# Patient Record
Sex: FEMALE | Race: BLACK OR AFRICAN AMERICAN | NOT HISPANIC OR LATINO | Employment: UNEMPLOYED | URBAN - METROPOLITAN AREA
[De-identification: names, ages, dates, MRNs, and addresses within clinical notes are randomized per-mention and may not be internally consistent; named-entity substitution may affect disease eponyms.]

---

## 2023-09-29 ENCOUNTER — HOSPITAL ENCOUNTER (EMERGENCY)
Facility: CLINIC | Age: 63
Discharge: HOME | End: 2023-09-29
Attending: EMERGENCY MEDICINE | Admitting: EMERGENCY MEDICINE
Payer: COMMERCIAL

## 2023-09-29 VITALS
SYSTOLIC BLOOD PRESSURE: 154 MMHG | HEART RATE: 68 BPM | RESPIRATION RATE: 16 BRPM | WEIGHT: 155 LBS | TEMPERATURE: 97.4 F | DIASTOLIC BLOOD PRESSURE: 90 MMHG | OXYGEN SATURATION: 99 %

## 2023-09-29 DIAGNOSIS — R53.1 WEAKNESS: ICD-10-CM

## 2023-09-29 DIAGNOSIS — E87.6 HYPOKALEMIA: ICD-10-CM

## 2023-09-29 DIAGNOSIS — R11.2 NAUSEA AND VOMITING, UNSPECIFIED VOMITING TYPE: ICD-10-CM

## 2023-09-29 LAB
ALBUMIN SERPL BCG-MCNC: 3.8 G/DL (ref 3.5–5.2)
ALP SERPL-CCNC: 93 U/L (ref 35–104)
ALT SERPL W P-5'-P-CCNC: 24 U/L (ref 0–50)
ANION GAP SERPL CALCULATED.3IONS-SCNC: 13 MMOL/L (ref 7–15)
AST SERPL W P-5'-P-CCNC: 25 U/L (ref 0–45)
ATRIAL RATE - MUSE: 63 BPM
BASOPHILS # BLD AUTO: 0 10E3/UL (ref 0–0.2)
BASOPHILS NFR BLD AUTO: 0 %
BILIRUB SERPL-MCNC: <0.2 MG/DL
BUN SERPL-MCNC: 9.6 MG/DL (ref 8–23)
CALCIUM SERPL-MCNC: 9.2 MG/DL (ref 8.8–10.2)
CHLORIDE SERPL-SCNC: 108 MMOL/L (ref 98–107)
CREAT SERPL-MCNC: 0.88 MG/DL (ref 0.51–0.95)
DEPRECATED HCO3 PLAS-SCNC: 22 MMOL/L (ref 22–29)
DIASTOLIC BLOOD PRESSURE - MUSE: NORMAL MMHG
EGFRCR SERPLBLD CKD-EPI 2021: 74 ML/MIN/1.73M2
EOSINOPHIL # BLD AUTO: 0.1 10E3/UL (ref 0–0.7)
EOSINOPHIL NFR BLD AUTO: 2 %
ERYTHROCYTE [DISTWIDTH] IN BLOOD BY AUTOMATED COUNT: 12.7 % (ref 10–15)
GLUCOSE SERPL-MCNC: 122 MG/DL (ref 70–99)
HCT VFR BLD AUTO: 38.6 % (ref 35–47)
HGB BLD-MCNC: 11.9 G/DL (ref 11.7–15.7)
IMM GRANULOCYTES # BLD: 0 10E3/UL
IMM GRANULOCYTES NFR BLD: 0 %
INTERPRETATION ECG - MUSE: NORMAL
LYMPHOCYTES # BLD AUTO: 1.5 10E3/UL (ref 0.8–5.3)
LYMPHOCYTES NFR BLD AUTO: 32 %
MCH RBC QN AUTO: 26.5 PG (ref 26.5–33)
MCHC RBC AUTO-ENTMCNC: 30.8 G/DL (ref 31.5–36.5)
MCV RBC AUTO: 86 FL (ref 78–100)
MONOCYTES # BLD AUTO: 0.4 10E3/UL (ref 0–1.3)
MONOCYTES NFR BLD AUTO: 10 %
NEUTROPHILS # BLD AUTO: 2.5 10E3/UL (ref 1.6–8.3)
NEUTROPHILS NFR BLD AUTO: 56 %
NRBC # BLD AUTO: 0 10E3/UL
NRBC BLD AUTO-RTO: 0 /100
P AXIS - MUSE: 58 DEGREES
PLAT MORPH BLD: NORMAL
PLATELET # BLD AUTO: 194 10E3/UL (ref 150–450)
POTASSIUM SERPL-SCNC: 3.3 MMOL/L (ref 3.4–5.3)
PR INTERVAL - MUSE: 188 MS
PROT SERPL-MCNC: 6.9 G/DL (ref 6.4–8.3)
QRS DURATION - MUSE: 94 MS
QT - MUSE: 428 MS
QTC - MUSE: 437 MS
R AXIS - MUSE: -2 DEGREES
RBC # BLD AUTO: 4.49 10E6/UL (ref 3.8–5.2)
RBC MORPH BLD: NORMAL
SODIUM SERPL-SCNC: 143 MMOL/L (ref 135–145)
SYSTOLIC BLOOD PRESSURE - MUSE: NORMAL MMHG
T AXIS - MUSE: 41 DEGREES
VENTRICULAR RATE- MUSE: 63 BPM
WBC # BLD AUTO: 4.5 10E3/UL (ref 4–11)

## 2023-09-29 PROCEDURE — 93005 ELECTROCARDIOGRAM TRACING: CPT

## 2023-09-29 PROCEDURE — 258N000003 HC RX IP 258 OP 636: Performed by: EMERGENCY MEDICINE

## 2023-09-29 PROCEDURE — 250N000013 HC RX MED GY IP 250 OP 250 PS 637: Performed by: EMERGENCY MEDICINE

## 2023-09-29 PROCEDURE — 99284 EMERGENCY DEPT VISIT MOD MDM: CPT | Mod: 25

## 2023-09-29 PROCEDURE — 80053 COMPREHEN METABOLIC PANEL: CPT | Performed by: EMERGENCY MEDICINE

## 2023-09-29 PROCEDURE — 96361 HYDRATE IV INFUSION ADD-ON: CPT

## 2023-09-29 PROCEDURE — 36415 COLL VENOUS BLD VENIPUNCTURE: CPT | Performed by: EMERGENCY MEDICINE

## 2023-09-29 PROCEDURE — 85014 HEMATOCRIT: CPT | Performed by: EMERGENCY MEDICINE

## 2023-09-29 PROCEDURE — 96360 HYDRATION IV INFUSION INIT: CPT

## 2023-09-29 RX ORDER — POTASSIUM CHLORIDE 1500 MG/1
40 TABLET, EXTENDED RELEASE ORAL ONCE
Status: COMPLETED | OUTPATIENT
Start: 2023-09-29 | End: 2023-09-29

## 2023-09-29 RX ADMIN — POTASSIUM CHLORIDE 40 MEQ: 1500 TABLET, EXTENDED RELEASE ORAL at 13:45

## 2023-09-29 RX ADMIN — SODIUM CHLORIDE 1000 ML: 9 INJECTION, SOLUTION INTRAVENOUS at 11:33

## 2023-09-29 ASSESSMENT — ACTIVITIES OF DAILY LIVING (ADL)
ADLS_ACUITY_SCORE: 35
ADLS_ACUITY_SCORE: 35

## 2023-09-29 NOTE — ED TRIAGE NOTES
Pt BIBA from airport today for generalized weakness and emesis x1 that started around 0830. Said she didn't sleep well or eat much this morning only due to travel. Pt is A&Ox4, VSS, no significant past history. Denies pain, SOB, neuro intact. Blood sugar 163. Denies feeling sick/cough. Did get 4 IV zofran with EMS     Triage Assessment       Row Name 09/29/23 1123       Triage Assessment (Adult)    Airway WDL WDL       Respiratory WDL    Respiratory WDL WDL       Skin Circulation/Temperature WDL    Skin Circulation/Temperature WDL WDL       Cardiac WDL    Cardiac WDL WDL       Peripheral/Neurovascular WDL    Peripheral Neurovascular WDL WDL       Cognitive/Neuro/Behavioral WDL    Cognitive/Neuro/Behavioral WDL WDL

## 2023-09-29 NOTE — DISCHARGE INSTRUCTIONS
Try to rest and drink lots of fluids.  Increase dietary potassium.  Follow-up with your primary care provider when you return home.  Return immediately to the nearest emergency department if you have recurrent symptoms or new concerns of any kind.

## 2023-09-29 NOTE — LETTER
September 29, 2023      To Whom It May Concern:      Camelia Woodward was seen in our Emergency Department today, 09/29/23. She is safe to fly today.    Sincerely,        Michelle Shepherd MD

## 2023-09-29 NOTE — ED NOTES
Bed: FT01  Expected date:   Expected time:   Means of arrival:   Comments:  Gina Baker nausea weakness here now

## 2023-09-29 NOTE — ED PROVIDER NOTES
"  History     Chief Complaint:  Generalized Weakness and Nausea       The history is provided by the patient and the spouse.      Camelia Woodward is a 62 year old female with a history of hyperlipidemia who presents with her  for generalized weakness and nausea.  Camelia was anxious last night preparing for a trip today.  She only slept 2 hours.  They woke up very early and went to the airport where their flight was delayed.  She did not eat at all.  When she was on the flight from [place] to [place] she began feeling very weak and dehydrated.  When asked about lightheadedness she responds it was \"more than that.\"  She started to feel nauseous and had an episode of vomiting.  When they arrived in [place] she was transported to the hospital.  She is already feeling better in regards to both nausea after Zofran and in regards to her generalized weakness.  Her  is requesting a note so they can fly to their final destination in [place].  The patient denies chest pain, shortness of breath, fever, cough, diarrhea, or any other new or concerning symptoms.    Independent Historian:   The patient and her  provided the history as noted above.    Review of External Notes:   I reviewed the patient's PCP visit from June in which she was seen for a bunion of her great toe.    Medications:    The patient is currently on no regular medications.    Past Medical History:    Bunion great toe  Insomnia  PND  Tremor  S/P hysterectomy  Dyslipidemia  Hyperlipidemia    Past Surgical History:    Hysterectomy  Breast biopsy     Physical Exam   Patient Vitals for the past 24 hrs:   BP Temp Temp src Pulse Resp SpO2 Weight   09/29/23 1129 (!) 141/71 97.4  F (36.3  C) Oral 63 16 100 % --   09/29/23 1122 -- -- -- -- -- -- 70.3 kg (155 lb)      Physical Exam  General: Well-developed and well-nourished. Well appearing middle-aged woman. Cooperative.  Head:  Atraumatic.  Eyes:  Conjunctivae, lids, and sclerae are " normal.  ENT:    Normal nose. Moist mucous membranes.  Neck:  Supple. Normal range of motion.  CV:  Regular rate and rhythm. Normal heart sounds with no murmurs, rubs, or gallops detected.  Resp:  No respiratory distress. Clear to auscultation bilaterally without decreased breath sounds, wheezing, rales, or rhonchi.  GI:  Soft. Non-distended. Non-tender.    MS:  Normal ROM. No bilateral lower extremity edema.  Skin:  Warm. Non-diaphoretic. No pallor.  Neuro:  Awake. A&Ox3. Normal strength.  Psych: Normal mood and affect. Normal speech.  Vitals reviewed.    Emergency Department Course   EKG  Indication: Weakness  Time: 1137  Rate 63 bpm. AR interval 188. QRS duration 94. QT/QTc 428/437.   Normal sinus rhythm with sinus arrhythmia  Minimal voltage criteria for LVH, may be normal variant   No acute ST changes.  No previous ECG available.    Laboratory:  Labs Ordered and Resulted from Time of ED Arrival to Time of ED Departure   COMPREHENSIVE METABOLIC PANEL - Abnormal       Result Value    Sodium 143      Potassium 3.3 (*)     Carbon Dioxide (CO2) 22      Anion Gap 13      Urea Nitrogen 9.6      Creatinine 0.88      GFR Estimate 74      Calcium 9.2      Chloride 108 (*)     Glucose 122 (*)     Alkaline Phosphatase 93      AST 25      ALT 24      Protein Total 6.9      Albumin 3.8      Bilirubin Total <0.2     CBC WITH PLATELETS AND DIFFERENTIAL - Abnormal    WBC Count 4.5      RBC Count 4.49      Hemoglobin 11.9      Hematocrit 38.6      MCV 86      MCH 26.5      MCHC 30.8 (*)     RDW 12.7      Platelet Count 194      % Neutrophils 56      % Lymphocytes 32      % Monocytes 10      % Eosinophils 2      % Basophils 0      % Immature Granulocytes 0      NRBCs per 100 WBC 0      Absolute Neutrophils 2.5      Absolute Lymphocytes 1.5      Absolute Monocytes 0.4      Absolute Eosinophils 0.1      Absolute Basophils 0.0      Absolute Immature Granulocytes 0.0      Absolute NRBCs 0.0     RBC AND PLATELET MORPHOLOGY    Platelet  Assessment        Value: Automated Count Confirmed. Platelet morphology is normal.    RBC Morphology Confirmed RBC Indices        Emergency Department Course & Assessments:    Interventions:  Medications   sodium chloride 0.9% BOLUS 1,000 mL (1,000 mLs Intravenous Stopped 9/29/23 1357)   potassium chloride ER (KLOR-CON M) CR tablet 40 mEq (40 mEq Oral $Given 9/29/23 1345)      Assessments:  1156 I obtained history and examined the patient as noted above.   1450 I discussed findings and discharge with the patient. All questions answered. Patient is comfortable with discharge.    Independent Interpretation (X-rays, CTs, rhythm strip):  Not applicable    Consultations/Discussion of Management or Tests:  None     Social Determinants of Health affecting care:   Currently travelling.  Supportive .    Disposition:  The patient was discharged.     Impression & Plan    Medical Decision Making:  Camelia is a 63 year old woman presenting after she became very weak and had nausea and vomiting while on a flight from [place].  She believes this is related to minimal sleep last night, stress related to travel, and poor oral intake today.  She describes near syncope without loss of consciousness, chest pain, or other concerning features.  She appears well on exam.    EKG is reassuring without acute ST changes or arrhythmias.  There is no kidney injury with mild hypokalemia to 3.3 repleted with oral potassium.  She does not have leukocytosis or anemia.    She felt significantly improved after 1 L of IV fluids and on repeat evaluation is sitting in bed in her street close requesting discharge.  I think this is reasonable course of action as this appears to have been a benign cause of near syncope. She agrees to try to get more sleep, drink lots of fluids, and eat at regular intervals to prevent recurrence of symptoms.  She is safe to fly to her final destination and follow-up with her primary care provider when she returns from.   Indications to return to the nearest emergency department were provided and I answered all her questions.     Diagnosis:    ICD-10-CM    1. Weakness  R53.1       2. Nausea and vomiting, unspecified vomiting type  R11.2       3. Hypokalemia  E87.6         Scribe Disclosure:  I, Rodri Nash, am serving as a scribe at 12:07 PM on 9/29/2023 to document services personally performed by Michelle Shepherd MD based on my observations and the provider's statements to me.     9/29/2023   Michelle Shepherd MD Dixson, Kylie S, MD  10/02/23 2028